# Patient Record
Sex: FEMALE | Race: WHITE | NOT HISPANIC OR LATINO | Employment: STUDENT | URBAN - METROPOLITAN AREA
[De-identification: names, ages, dates, MRNs, and addresses within clinical notes are randomized per-mention and may not be internally consistent; named-entity substitution may affect disease eponyms.]

---
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
## 2023-01-26 ENCOUNTER — CLINICAL SUPPORT (OUTPATIENT)
Dept: OBGYN CLINIC | Facility: CLINIC | Age: 18
End: 2023-01-26

## 2023-01-26 VITALS
DIASTOLIC BLOOD PRESSURE: 66 MMHG | HEIGHT: 63 IN | SYSTOLIC BLOOD PRESSURE: 96 MMHG | WEIGHT: 112 LBS | BODY MASS INDEX: 19.84 KG/M2

## 2023-01-26 DIAGNOSIS — Z30.013 ENCOUNTER FOR INITIAL PRESCRIPTION OF INJECTABLE CONTRACEPTIVE: Primary | ICD-10-CM

## 2023-01-26 DIAGNOSIS — Z30.42 ENCOUNTER FOR MANAGEMENT AND INJECTION OF DEPO-PROVERA: ICD-10-CM

## 2023-01-26 LAB — SL AMB POCT URINE HCG: NEGATIVE

## 2023-01-26 RX ORDER — MEDROXYPROGESTERONE ACETATE 150 MG/ML
150 INJECTION, SUSPENSION INTRAMUSCULAR ONCE
Status: COMPLETED | OUTPATIENT
Start: 2023-01-26 | End: 2023-01-26

## 2023-01-26 RX ADMIN — MEDROXYPROGESTERONE ACETATE 150 MG: 150 INJECTION, SUSPENSION INTRAMUSCULAR at 10:13

## 2023-01-26 NOTE — PROGRESS NOTES
Presents for Depo injection 3 weeks past window  Franco Simpson Needs pregnancy test  Urine HPT-negative  Patient denies bleeding/spotting  Wishes to continue with depo  Administered Right buttock, patient tolererated well

## 2023-07-06 ENCOUNTER — TELEPHONE (OUTPATIENT)
Dept: OBGYN CLINIC | Facility: CLINIC | Age: 18
End: 2023-07-06

## 2023-07-06 DIAGNOSIS — Z30.42 ENCOUNTER FOR SURVEILLANCE OF INJECTABLE CONTRACEPTIVE: Primary | ICD-10-CM

## 2023-07-06 DIAGNOSIS — Z30.42 ENCOUNTER FOR MANAGEMENT AND INJECTION OF DEPO-PROVERA: ICD-10-CM

## 2023-07-06 RX ORDER — MEDROXYPROGESTERONE ACETATE 150 MG/ML
150 INJECTION, SUSPENSION INTRAMUSCULAR
Qty: 1 ML | Refills: 1 | Status: SHIPPED | OUTPATIENT
Start: 2023-07-06

## 2023-07-06 NOTE — TELEPHONE ENCOUNTER
Amaris morris/lucius the pharmacy has sent several requests to refill dep for tomorrow's appt. (1:25pm) They have not received response. Per chart, surescripts transmitted refill request on 7/3 and 7/6 to Leyda Broussard who has been out of the office since 6/29/2023. Spoke with Joselito Gayle, advised will forward to oncall provider to review in Jeanne's absence. Encouraged patient to call [facility] if needs refill for request to be directed appropriately. Dr. Aj Johnson, [facility] with Leyda Broussard 10/19/2023.  Order pended for signature

## 2023-07-07 ENCOUNTER — CLINICAL SUPPORT (OUTPATIENT)
Dept: OBGYN CLINIC | Facility: CLINIC | Age: 18
End: 2023-07-07
Payer: COMMERCIAL

## 2023-07-07 VITALS
DIASTOLIC BLOOD PRESSURE: 60 MMHG | SYSTOLIC BLOOD PRESSURE: 100 MMHG | BODY MASS INDEX: 20.55 KG/M2 | WEIGHT: 116 LBS | HEIGHT: 63 IN

## 2023-07-07 DIAGNOSIS — Z30.42 ENCOUNTER FOR SURVEILLANCE OF INJECTABLE CONTRACEPTIVE: Primary | ICD-10-CM

## 2023-07-07 PROCEDURE — 96372 THER/PROPH/DIAG INJ SC/IM: CPT

## 2023-07-07 RX ORDER — MEDROXYPROGESTERONE ACETATE 150 MG/ML
150 INJECTION, SUSPENSION INTRAMUSCULAR ONCE
Status: COMPLETED | OUTPATIENT
Start: 2023-07-07 | End: 2023-07-07

## 2023-07-07 RX ADMIN — MEDROXYPROGESTERONE ACETATE 150 MG: 150 INJECTION, SUSPENSION INTRAMUSCULAR at 13:46

## 2023-10-05 ENCOUNTER — CLINICAL SUPPORT (OUTPATIENT)
Dept: OBGYN CLINIC | Facility: CLINIC | Age: 18
End: 2023-10-05
Payer: COMMERCIAL

## 2023-10-05 VITALS — BODY MASS INDEX: 19.16 KG/M2 | SYSTOLIC BLOOD PRESSURE: 94 MMHG | DIASTOLIC BLOOD PRESSURE: 60 MMHG | WEIGHT: 109 LBS

## 2023-10-05 DIAGNOSIS — Z30.42 ENCOUNTER FOR SURVEILLANCE OF INJECTABLE CONTRACEPTIVE: Primary | ICD-10-CM

## 2023-10-05 DIAGNOSIS — Z30.42 ENCOUNTER FOR MANAGEMENT AND INJECTION OF DEPO-PROVERA: ICD-10-CM

## 2023-10-05 PROCEDURE — 96372 THER/PROPH/DIAG INJ SC/IM: CPT

## 2023-10-05 RX ORDER — MEDROXYPROGESTERONE ACETATE 150 MG/ML
150 INJECTION, SUSPENSION INTRAMUSCULAR ONCE
Status: COMPLETED | OUTPATIENT
Start: 2023-10-05 | End: 2023-10-05

## 2023-10-05 RX ADMIN — MEDROXYPROGESTERONE ACETATE 150 MG: 150 INJECTION, SUSPENSION INTRAMUSCULAR at 10:01

## 2023-10-05 NOTE — PROGRESS NOTES
Depo-Provera 150mg/ml was given IM intl Left gluteal muscle. Amaris tolerated injection procedure.    Depo-Provera Lot#: [id number]    expiration date: 09/30/2027   : [employer]

## 2023-10-19 ENCOUNTER — ANNUAL EXAM (OUTPATIENT)
Dept: OBGYN CLINIC | Facility: CLINIC | Age: 18
End: 2023-10-19

## 2023-10-19 VITALS
DIASTOLIC BLOOD PRESSURE: 54 MMHG | HEIGHT: 63 IN | BODY MASS INDEX: 19.84 KG/M2 | WEIGHT: 112 LBS | SYSTOLIC BLOOD PRESSURE: 94 MMHG

## 2023-10-19 DIAGNOSIS — Z30.42 ENCOUNTER FOR SURVEILLANCE OF INJECTABLE CONTRACEPTIVE: ICD-10-CM

## 2023-10-19 DIAGNOSIS — Z01.419 ENCOUNTER FOR GYNECOLOGICAL EXAMINATION WITHOUT ABNORMAL FINDING: Primary | ICD-10-CM

## 2023-10-19 DIAGNOSIS — Z11.3 SCREEN FOR STD (SEXUALLY TRANSMITTED DISEASE): ICD-10-CM

## 2023-10-19 DIAGNOSIS — N89.8 VAGINAL DISCHARGE: ICD-10-CM

## 2023-10-19 PROCEDURE — 87591 N.GONORRHOEAE DNA AMP PROB: CPT | Performed by: NURSE PRACTITIONER

## 2023-10-19 PROCEDURE — 87510 GARDNER VAG DNA DIR PROBE: CPT | Performed by: NURSE PRACTITIONER

## 2023-10-19 PROCEDURE — 87660 TRICHOMONAS VAGIN DIR PROBE: CPT | Performed by: NURSE PRACTITIONER

## 2023-10-19 PROCEDURE — 87491 CHLMYD TRACH DNA AMP PROBE: CPT | Performed by: NURSE PRACTITIONER

## 2023-10-19 PROCEDURE — 87480 CANDIDA DNA DIR PROBE: CPT | Performed by: NURSE PRACTITIONER

## 2023-10-19 NOTE — PROGRESS NOTES
Assessment/Plan:         Diagnoses and all orders for this visit:    Encounter for gynecological examination without abnormal finding    Vaginal discharge  -     VAGINOSIS DNA PROBE (AFFIRM)    Screen for STD (sexually transmitted disease)  -     VAGINOSIS DNA PROBE (AFFIRM)  -     Chlamydia/GC amplified DNA by PCR    Encounter for surveillance of injectable contraceptive          Subjective:      Patient ID: Dennis Otoole is a 19 y.o. female.  here for annual gyn  On Depo Doing well No issues  Denies abd or pelvic pain  Bowel and bladder are normal SA h/o BV in past thinks she may have again. Having increased discharge No Odor Denies abd or pelvic pain Working FT        The following portions of the patient's history were reviewed and updated as appropriate: allergies, current medications, past family history, past medical history, past social history, past surgical history, and problem list.    Review of Systems   Constitutional:  Negative for fatigue and unexpected weight change. Gastrointestinal:  Negative for abdominal distention, abdominal pain, constipation and diarrhea. Genitourinary:  Positive for vaginal discharge. Negative for difficulty urinating, dyspareunia, dysuria, frequency, genital sores, menstrual problem, pelvic pain, urgency, vaginal bleeding and vaginal pain. Neurological:  Negative for headaches. Psychiatric/Behavioral: Negative. Negative for dysphoric mood. The patient is not nervous/anxious. Objective:      BP 94/54 (BP Location: Left arm, Patient Position: Sitting, Cuff Size: Standard)   Ht 5' 3.25" (1.607 m)   Wt 50.8 kg (112 lb)   BMI 19.68 kg/m²          Physical Exam  Vitals and nursing note reviewed. Constitutional:       General: She is not in acute distress. Appearance: Normal appearance. HENT:      Head: Normocephalic and atraumatic.    Pulmonary:      Effort: Pulmonary effort is normal.   Chest:   Breasts:     Breasts are symmetrical. Right: Normal. No mass, nipple discharge, skin change or tenderness. Left: Normal. No mass, nipple discharge, skin change or tenderness. Abdominal:      General: There is no distension. Palpations: Abdomen is soft. Tenderness: There is no abdominal tenderness. There is no guarding or rebound. Genitourinary:     General: Normal vulva. Exam position: Lithotomy position. Labia:         Right: No rash, tenderness, lesion or injury. Left: No rash, tenderness, lesion or injury. Urethra: No prolapse, urethral pain, urethral swelling or urethral lesion. Vagina: Normal. No erythema or lesions. Cervix: No cervical motion tenderness, discharge, lesion or cervical bleeding. Uterus: Normal.       Adnexa: Right adnexa normal and left adnexa normal.        Right: No mass or tenderness. Left: No mass or tenderness. Rectum: No mass or external hemorrhoid. Musculoskeletal:         General: Normal range of motion. Lymphadenopathy:      Upper Body:      Right upper body: No axillary adenopathy. Left upper body: No axillary adenopathy. Lower Body: No right inguinal adenopathy. No left inguinal adenopathy. Skin:     General: Skin is warm and dry. Neurological:      Mental Status: She is alert and oriented to person, place, and time. Psychiatric:         Mood and Affect: Mood normal.         Behavior: Behavior normal.         Thought Content:  Thought content normal.         Judgment: Judgment normal.

## 2023-10-20 DIAGNOSIS — A74.9 CHLAMYDIA INFECTION: Primary | ICD-10-CM

## 2023-10-20 LAB
C TRACH DNA SPEC QL NAA+PROBE: POSITIVE
N GONORRHOEA DNA SPEC QL NAA+PROBE: NEGATIVE

## 2023-10-20 RX ORDER — DOXYCYCLINE 100 MG/1
100 TABLET ORAL 2 TIMES DAILY
Qty: 14 TABLET | Refills: 0 | Status: SHIPPED | OUTPATIENT
Start: 2023-10-20 | End: 2023-10-27

## 2023-10-20 RX ORDER — MEDROXYPROGESTERONE ACETATE 150 MG/ML
150 INJECTION, SUSPENSION INTRAMUSCULAR
Qty: 1 ML | Refills: 3 | Status: SHIPPED | OUTPATIENT
Start: 2023-10-20

## 2023-10-20 NOTE — PATIENT INSTRUCTIONS
Pap smear to start at age 24 as per ASCCP guidelines. GC/CT cultures annually once sexually active, always condom use when sexually active, birth control as directed Use seat belt in every car ride, avoid smoking and alcohol use, exercise most days of the week, obtain appropriate nutrition and hydration, follow up with PCP for appropriate vaccine schedule.

## 2023-10-21 LAB
CANDIDA RRNA VAG QL PROBE: POSITIVE
G VAGINALIS RRNA GENITAL QL PROBE: POSITIVE
T VAGINALIS RRNA GENITAL QL PROBE: NEGATIVE

## 2023-10-23 ENCOUNTER — TELEPHONE (OUTPATIENT)
Dept: OBGYN CLINIC | Facility: CLINIC | Age: 18
End: 2023-10-23

## 2023-10-23 DIAGNOSIS — B96.89 BV (BACTERIAL VAGINOSIS): Primary | ICD-10-CM

## 2023-10-23 DIAGNOSIS — B37.31 VAGINAL YEAST INFECTION: ICD-10-CM

## 2023-10-23 DIAGNOSIS — N76.0 BV (BACTERIAL VAGINOSIS): Primary | ICD-10-CM

## 2023-10-23 RX ORDER — FLUCONAZOLE 150 MG/1
TABLET ORAL
Qty: 2 TABLET | Refills: 1 | Status: SHIPPED | OUTPATIENT
Start: 2023-10-23 | End: 2023-10-30

## 2023-10-23 RX ORDER — METRONIDAZOLE 7.5 MG/G
1 GEL VAGINAL
Qty: 70 G | Refills: 0 | Status: SHIPPED | OUTPATIENT
Start: 2023-10-23 | End: 2023-10-28

## 2023-10-23 NOTE — TELEPHONE ENCOUNTER
Pt + chlamydia, yeast ad BV RX doxy 100mg bid x 7 days  sent to [facility] Diflucan 1 now repeat in 3 days and Metrogel vaginal 1 applicator nightly for 5 nights

## 2023-10-25 ENCOUNTER — DOCUMENTATION (OUTPATIENT)
Dept: OBGYN CLINIC | Facility: CLINIC | Age: 18
End: 2023-10-25

## 2023-10-25 NOTE — PROGRESS NOTES
Received STD report form from [facility]. Form completed and faxed to Sotero Ramos, .  Fax confirmation received. Form sent to [facility] for patient's chart. Fax confirmation received.

## 2024-04-22 ENCOUNTER — OFFICE VISIT (OUTPATIENT)
Dept: OBGYN CLINIC | Facility: CLINIC | Age: 19
End: 2024-04-22
Payer: COMMERCIAL

## 2024-04-22 VITALS
BODY MASS INDEX: 22.32 KG/M2 | SYSTOLIC BLOOD PRESSURE: 94 MMHG | DIASTOLIC BLOOD PRESSURE: 60 MMHG | HEIGHT: 63 IN | WEIGHT: 126 LBS

## 2024-04-22 DIAGNOSIS — N64.4 MASTODYNIA, FEMALE: Primary | ICD-10-CM

## 2024-04-22 DIAGNOSIS — R63.5 WEIGHT GAIN: ICD-10-CM

## 2024-04-22 DIAGNOSIS — Z30.09 CONTRACEPTIVE EDUCATION: ICD-10-CM

## 2024-04-22 DIAGNOSIS — Z11.3 SCREEN FOR STD (SEXUALLY TRANSMITTED DISEASE): ICD-10-CM

## 2024-04-22 PROCEDURE — 99213 OFFICE O/P EST LOW 20 MIN: CPT | Performed by: OBSTETRICS & GYNECOLOGY

## 2024-04-22 NOTE — PROGRESS NOTES
"PROBLEM GYNECOLOGICAL VISIT    Amaris Quiñones is a 18 y.o. female who presents today with complaint of breast tenderness and  wants  RICKIE for  chlamydia treated  10/2023.    Her general medical history has been reviewed and she reports it as follows:    Past Medical History:   Diagnosis Date   • Chlamydia      History reviewed. No pertinent surgical history.  OB History        1    Para   1    Term                AB        Living   1       SAB        IAB        Ectopic        Multiple        Live Births                   Social History     Tobacco Use   • Smoking status: Some Days     Types: Cigarettes     Passive exposure: Never   • Smokeless tobacco: Never   Vaping Use   • Vaping status: Every Day   • Substances: Nicotine   Substance Use Topics   • Alcohol use: Yes     Comment: occasional   • Drug use: Never       No current outpatient medications      History of Present Illness:   19 yo   sexually active stopped  Depo in  10/2023 w spotting in  3/10 .   + breast tenderness for apas tmonth. Neg hpt last week.  + > 10 lb wt gain over the past  4 mo.   No  fm .       Review of Systems:  Review of Systems   Constitutional: Negative.    Gastrointestinal: Negative.    Endocrine: Negative.    Genitourinary:  Positive for vaginal discharge.   Skin: Negative.    Allergic/Immunologic: Negative.    Neurological: Negative.    Hematological: Negative.    Psychiatric/Behavioral: Negative.         Physical Exam:  BP 94/60 (BP Location: Left arm, Patient Position: Sitting, Cuff Size: Standard)   Ht 5' 3.25\" (1.607 m)   Wt 57.2 kg (126 lb)   LMP 03/10/2024   BMI 22.14 kg/m²   Physical Exam  Constitutional:       Appearance: Normal appearance.   Genitourinary:      Bladder and urethral meatus normal.      Right Labia: No rash, tenderness, lesions or skin changes.     Left Labia: No tenderness, lesions, skin changes or rash.     No inguinal adenopathy present in the right or left side.     Pelvic Maxwell " Score: 5/5.     No vaginal discharge, erythema, tenderness, ulceration or granulation tissue.      No vaginal prolapse present.       Right Adnexa: not tender, not full and no mass present.     Left Adnexa: not tender, not full and no mass present.     No cervical motion tenderness or friability.      Uterus is not enlarged, fixed, tender or prolapsed.      No uterine mass detected.     Uterus is not absent.     No urethral tenderness or mass present.      Pelvic Floor: Levator muscle strength is 4/5.     Pelvic floor neuro is intact.     Pelvic exam was performed with patient in the lithotomy position.   Breasts:     Maxwell Score is 4.      Right: No swelling, bleeding, inverted nipple, mass, nipple discharge, skin change or tenderness.      Left: No swelling, inverted nipple, mass, nipple discharge, skin change or tenderness.   Abdominal:      Hernia: There is no hernia in the left inguinal area or right inguinal area.      Comments: Striae    Lymphadenopathy:      Upper Body:      Right upper body: No supraclavicular or axillary adenopathy.      Left upper body: No supraclavicular or axillary adenopathy.      Lower Body: No right inguinal adenopathy. No left inguinal adenopathy.   Neurological:      Mental Status: She is alert.       Assessment:  Plan     1.  Breast tenderness   neg urine preg test ,   + wt gain supportive bra  and  may use  nsaids  and  ice  packs.    2.  Hx of chlamydia treated    gc chlam done  pt states she went to  pt first and had negative testing for   STIs 2 mo ago.   3.   + stopped Depo in  10/2023  w one  period of spotingin 3/10/2023.  To  get    tsh and   Hcg   qualitative this week.  Condoms  for  family planning .  R+B of  BELÉN , POP, Nuvaring and LARCS  dw pt    I have spent a total time of 22 minutes on 04/22/24 in caring for this patient including Risks and benefits of tx options, Instructions for management, Patient and family education, Importance of tx compliance, Counseling /  Coordination of care, Documenting in the medical record, Reviewing / ordering tests, medicine, procedures  , and Obtaining or reviewing history  .         Reviewed with patient that test results are available in MyChart immediately, but that they will not necessarily be reviewed by me immediately.  Explained that I will review results at my earliest opportunity and contact patient appropriately.

## 2024-04-25 LAB
C TRACH RRNA SPEC QL NAA+PROBE: NEGATIVE
N GONORRHOEA RRNA SPEC QL NAA+PROBE: NEGATIVE

## 2024-05-22 PROBLEM — Z11.3 SCREEN FOR STD (SEXUALLY TRANSMITTED DISEASE): Status: RESOLVED | Noted: 2024-04-22 | Resolved: 2024-05-22

## 2024-10-23 ENCOUNTER — OFFICE VISIT (OUTPATIENT)
Dept: OBGYN CLINIC | Facility: CLINIC | Age: 19
End: 2024-10-23
Payer: COMMERCIAL

## 2024-10-23 ENCOUNTER — NURSE TRIAGE (OUTPATIENT)
Age: 19
End: 2024-10-23

## 2024-10-23 VITALS
SYSTOLIC BLOOD PRESSURE: 82 MMHG | BODY MASS INDEX: 22.04 KG/M2 | WEIGHT: 124.4 LBS | HEIGHT: 63 IN | DIASTOLIC BLOOD PRESSURE: 58 MMHG

## 2024-10-23 DIAGNOSIS — N89.8 VAGINAL DISCHARGE: Primary | ICD-10-CM

## 2024-10-23 DIAGNOSIS — Z11.3 SCREEN FOR SEXUALLY TRANSMITTED DISEASES: ICD-10-CM

## 2024-10-23 PROCEDURE — 99213 OFFICE O/P EST LOW 20 MIN: CPT | Performed by: OBSTETRICS & GYNECOLOGY

## 2024-10-23 NOTE — TELEPHONE ENCOUNTER
"Spoke with patient who reports a yellow/clear vaginal discharge that started about a week ago. She denies any odor/pain.  Appointment made for today.  No further questions or concerns at this time.    Reason for Disposition   Patient wants to be seen    Answer Assessment - Initial Assessment Questions  1. SYMPTOM: \"What's the main symptom you're concerned about?\" (e.g., rash, itching, swelling, dryness)      Vaginal discharge and discomfort  3. ONSET: \"When did this  start?\"      About a week ago  4. PAIN: \"Is there any pain?\" If Yes, ask: \"How bad is it?\" (Scale: 1-10; mild, moderate, severe)      \"Uncomfortable\"  5. CAUSE: \"What do you think is causing the symptoms?\"      unsure  6. OTHER SYMPTOMS: \"Do you have any other symptoms?\" (e.g., fever, vaginal bleeding, pain with urination)      denies    Protocols used: Vulvar Symptoms-Adult-[place]    "

## 2024-10-23 NOTE — PROGRESS NOTES
[facility] [address], [address], [place] [address]  Assessment & Plan  Vaginal discharge  Appears physiologic. Will check for GC/CT as she has had CT in the past       Screen for sexually transmitted diseases    Orders:    Chlamydia/GC BRIANA, Confirmation    Subjective:   Amaris Quiñones is a 19 y.o.  .  CC:   Chief Complaint   Patient presents with    Gynecology Problem     Vaginal discharge for about a week, sometimes clear / then yellow no odor        HPI: see above. No fever or chills    ROS: Negative except as noted in HPI    Patient's last menstrual period was 10/17/2024 (exact date).       She  reports being sexually active and has had partner(s) who are male. She reports using the following method of birth control/protection: Condom Male.       The following portions of the patient's history were reviewed and updated as appropriate:   Past Medical History:   Diagnosis Date    Chlamydia      No past surgical history on file.  No family history on file.  Social History     Socioeconomic History    Marital status: Unknown     Spouse name: None    Number of children: None    Years of education: None    Highest education level: None   Occupational History    None   Tobacco Use    Smoking status: Some Days     Types: Cigarettes     Passive exposure: Never    Smokeless tobacco: Never   Vaping Use    Vaping status: Every Day    Substances: Nicotine   Substance and Sexual Activity    Alcohol use: Yes     Comment: occasional    Drug use: Never    Sexual activity: Yes     Partners: Male     Birth control/protection: Condom Male     Comment: 1 new partner in past year   Other Topics Concern    None   Social History Narrative    None     Social Determinants of Health     Financial Resource Strain: Not on file   Food Insecurity: Not on file   Transportation Needs: Not on file   Physical Activity: Not on file   Stress: Not on file   Social Connections: Unknown (2024)    Received from  "[facility]    Social Connections     How often do you feel lonely or isolated from those around you? (Adult - for ages 18 years and over): Not on file   Intimate Partner Violence: Not on file   Housing Stability: Not on file     No outpatient medications have been marked as taking for the 10/23/24 encounter (Office Visit) with Mak Kruger MD.     No Known Allergies        Objective:  BP (!) 82/58 (BP Location: Left arm, Patient Position: Sitting, Cuff Size: Standard)   Ht 5' 2.75\" (1.594 m)   Wt 56.4 kg (124 lb 6.4 oz)   LMP 10/17/2024 (Exact Date)   BMI 22.21 kg/m²        Chaperone present? Yes: .    General Appearance: alert and oriented, in no acute distress.   Abdomen: Soft, non-tender, non-distended, no masses, no rebound or guarding.  Pelvic:       External genitalia: Normal appearance, no abnormal pigmentation, no lesions or masses. Normal Bartholin's and [place]'s.      Urinary system: Urethral meatus normal, bladder non-tender.      Vaginal: normal mucosa without prolapse or lesions. Normal-appearing physiologic discharge. pH 3.5. Negative microscopy and KOH      Cervix: Normal-appearing, well-epithelialized, no gross lesions or masses. No cervical motion tenderness.      Adnexa: No adnexal masses or tenderness noted.      Uterus: Normal-sized, regular contour, midline, mobile, no uterine tenderness.   Extremities: Normal range of motion.   Skin: normal, no rash or abnormalities  Neurologic: alert, oriented x3  Psychiatric: Appropriate affect, mood stable, cooperative with exam.        Mak Kruger MD  10/23/2024 4:39 PM  "

## 2024-10-29 LAB
C TRACH RRNA SPEC QL NAA+PROBE: NEGATIVE
N GONORRHOEA RRNA SPEC QL NAA+PROBE: NEGATIVE

## 2025-04-23 ENCOUNTER — TELEPHONE (OUTPATIENT)
Age: 20
End: 2025-04-23

## 2025-04-23 ENCOUNTER — OFFICE VISIT (OUTPATIENT)
Dept: OBGYN CLINIC | Facility: CLINIC | Age: 20
End: 2025-04-23
Payer: COMMERCIAL

## 2025-04-23 VITALS
DIASTOLIC BLOOD PRESSURE: 44 MMHG | SYSTOLIC BLOOD PRESSURE: 84 MMHG | WEIGHT: 108.4 LBS | HEIGHT: 63 IN | BODY MASS INDEX: 19.21 KG/M2

## 2025-04-23 DIAGNOSIS — N92.6 IRREGULAR BLEEDING: Primary | ICD-10-CM

## 2025-04-23 LAB — SL AMB POCT URINE HCG: NEGATIVE

## 2025-04-23 PROCEDURE — 81025 URINE PREGNANCY TEST: CPT | Performed by: STUDENT IN AN ORGANIZED HEALTH CARE EDUCATION/TRAINING PROGRAM

## 2025-04-23 PROCEDURE — 99213 OFFICE O/P EST LOW 20 MIN: CPT | Performed by: STUDENT IN AN ORGANIZED HEALTH CARE EDUCATION/TRAINING PROGRAM

## 2025-04-23 NOTE — TELEPHONE ENCOUNTER
Pt had another persons phone number as her mobile needs to be updated person called in and gave the number ending in [phone] is not this pts number and another person is getting text messages regarding her appts with [facility]. Pt is currently at an appt and staff will update number with pt.

## 2025-04-23 NOTE — PROGRESS NOTES
"Name: Amaris Quiñones      : 2005      MRN: [id number]  Encounter Provider: Jayy Miller MD  Encounter Date: 2025   Encounter department: [facility] [place]  :  Assessment & Plan  Irregular bleeding  - No abnormal findings on pelvic exam. Urine pregnancy test normal.   - Discussed most likely cause is ovulatory dysfunction. Would expect spontaneous resolution within 1-2 menstrual cycles. If persistent, would recommend pelvic ultrasound to assess for endometrial polyp. Patient will call or send [facility] message if irregular bleeding persists and order can be provided at that time.   Orders:  •  POCT urine HCG        History of Present Illness   HPI  Amaris Quiñones is a 19 y.o. female who presents for evaluation of irregular vaginal bleeding over the last month. She stopped DMPA at end of . Menses returned about 6 months later. Periods have been regular since. Last month, menses came 2 weeks early. Had intercourse yesterday and bleeding started again. This was about 2 weeks after prior menstrual bleeding. Reports that flow is period-like. Denies vaginal discharge, pelvic pain, or urinary symptoms. She reports no concern for exposure to STI and declines screening today.   History obtained from: patient    Today, outside lab results including TSH, free T4, and CBC from 2025 were reviewed.     Review of Systems   All other systems reviewed and are negative.  Medical History Reviewed by provider this encounter:  Tobacco  Med Hx  Surg Hx  Fam Hx  Soc Hx    .     Objective   BP (!) 84/44 (BP Location: Left arm, Patient Position: Sitting, Cuff Size: Standard)   Ht 5' 3.25\" (1.607 m)   Wt 49.2 kg (108 lb 6.4 oz)   LMP 2025 (Exact Date)   BMI 19.05 kg/m²      Physical Exam  Vitals reviewed. Exam conducted with a chaperone present (Anjelica Luevano MA).   Constitutional:       Appearance: Normal appearance.   Cardiovascular:      Rate and Rhythm: Normal rate. "   Pulmonary:      Effort: Pulmonary effort is normal.   Genitourinary:     Exam position: Lithotomy position.      Labia:         Right: No rash or tenderness.         Left: No rash or tenderness.       Vagina: Bleeding present. No vaginal discharge, erythema, tenderness, lesions or prolapsed vaginal walls.      Cervix: Normal. No cervical motion tenderness, discharge, friability, lesion, erythema or cervical bleeding.      Uterus: Normal.       Adnexa: Right adnexa normal and left adnexa normal.        Right: No mass, tenderness or fullness.          Left: No mass, tenderness or fullness.        Comments: Menstrual-like bleeding present.   Neurological:      General: No focal deficit present.      Mental Status: She is alert and oriented to person, place, and time.   Psychiatric:         Mood and Affect: Mood normal.         Behavior: Behavior normal.         Thought Content: Thought content normal.         Judgment: Judgment normal.